# Patient Record
Sex: MALE | Race: WHITE
[De-identification: names, ages, dates, MRNs, and addresses within clinical notes are randomized per-mention and may not be internally consistent; named-entity substitution may affect disease eponyms.]

---

## 2017-03-21 ENCOUNTER — HOSPITAL ENCOUNTER (OUTPATIENT)
Dept: HOSPITAL 45 - C.LAB1850 | Age: 82
Discharge: HOME | End: 2017-03-21
Attending: INTERNAL MEDICINE
Payer: COMMERCIAL

## 2017-03-21 DIAGNOSIS — I10: ICD-10-CM

## 2017-03-21 DIAGNOSIS — R97.20: ICD-10-CM

## 2017-03-21 DIAGNOSIS — C61: ICD-10-CM

## 2017-03-21 DIAGNOSIS — E78.5: Primary | ICD-10-CM

## 2017-03-21 LAB
ANION GAP SERPL CALC-SCNC: 7 MMOL/L (ref 3–11)
BUN SERPL-MCNC: 20 MG/DL (ref 7–18)
BUN/CREAT SERPL: 15 (ref 10–20)
CALCIUM SERPL-MCNC: 9.6 MG/DL (ref 8.5–10.1)
CHLORIDE SERPL-SCNC: 109 MMOL/L (ref 98–107)
CHOLEST/HDLC SERPL: 3.2 {RATIO}
CO2 SERPL-SCNC: 27 MMOL/L (ref 21–32)
CREAT SERPL-MCNC: 1.3 MG/DL (ref 0.6–1.4)
GLUCOSE SERPL-MCNC: 92 MG/DL (ref 70–99)
GLUCOSE UR QL: 37 MG/DL
KETONES UR QL STRIP: 68 MG/DL
NITRITE UR QL STRIP: 77 MG/DL (ref 0–150)
PH UR: 120 MG/DL (ref 0–200)
POTASSIUM SERPL-SCNC: 3.9 MMOL/L (ref 3.5–5.1)
PSA SERPL-MCNC: 10.9 NG/ML (ref 0–4)
SODIUM SERPL-SCNC: 143 MMOL/L (ref 136–145)
VERY LOW DENSITY LIPOPROT CALC: 15 MG/DL

## 2017-09-12 ENCOUNTER — HOSPITAL ENCOUNTER (OUTPATIENT)
Dept: HOSPITAL 45 - C.LAB1850 | Age: 82
Discharge: HOME | End: 2017-09-12
Attending: INTERNAL MEDICINE
Payer: COMMERCIAL

## 2017-09-12 DIAGNOSIS — M10.9: ICD-10-CM

## 2017-09-12 DIAGNOSIS — N18.3: ICD-10-CM

## 2017-09-12 DIAGNOSIS — C61: Primary | ICD-10-CM

## 2017-09-12 DIAGNOSIS — E78.5: ICD-10-CM

## 2017-09-12 DIAGNOSIS — E55.9: ICD-10-CM

## 2017-09-12 DIAGNOSIS — I10: ICD-10-CM

## 2017-09-12 DIAGNOSIS — D64.9: ICD-10-CM

## 2017-09-12 LAB
ANION GAP SERPL CALC-SCNC: 5 MMOL/L (ref 3–11)
BASOPHILS # BLD: 0.05 K/UL (ref 0–0.2)
BASOPHILS NFR BLD: 0.7 %
BUN SERPL-MCNC: 20 MG/DL (ref 7–18)
BUN/CREAT SERPL: 14.2 (ref 10–20)
CALCIUM SERPL-MCNC: 10.1 MG/DL (ref 8.5–10.1)
CHLORIDE SERPL-SCNC: 109 MMOL/L (ref 98–107)
CHOLEST/HDLC SERPL: 3 {RATIO}
CO2 SERPL-SCNC: 28 MMOL/L (ref 21–32)
COMPLETE: YES
CREAT SERPL-MCNC: 1.4 MG/DL (ref 0.6–1.4)
EOSINOPHIL NFR BLD AUTO: 159 K/UL (ref 130–400)
FERRITIN SERPL-MCNC: 139.5 NG/ML (ref 8–388)
GLUCOSE SERPL-MCNC: 98 MG/DL (ref 70–99)
GLUCOSE UR QL: 43 MG/DL
HCT VFR BLD CALC: 42.3 % (ref 42–52)
IG%: 0.4 %
IMM GRANULOCYTES NFR BLD AUTO: 18.6 %
KETONES UR QL STRIP: 67 MG/DL
LYMPHOCYTES # BLD: 1.29 K/UL (ref 1.2–3.4)
MCH RBC QN AUTO: 31.2 PG (ref 25–34)
MCHC RBC AUTO-ENTMCNC: 35 G/DL (ref 32–36)
MCV RBC AUTO: 89.1 FL (ref 80–100)
MONOCYTES NFR BLD: 7.8 %
NEUTROPHILS # BLD AUTO: 5.3 %
NEUTROPHILS NFR BLD AUTO: 67.2 %
NITRITE UR QL STRIP: 91 MG/DL (ref 0–150)
PH UR: 128 MG/DL (ref 0–200)
PMV BLD AUTO: 10 FL (ref 7.4–10.4)
POTASSIUM SERPL-SCNC: 4.5 MMOL/L (ref 3.5–5.1)
PSA SERPL-MCNC: 10.5 NG/ML (ref 0–4)
RBC # BLD AUTO: 4.75 M/UL (ref 4.7–6.1)
SODIUM SERPL-SCNC: 142 MMOL/L (ref 136–145)
URATE SERPL-MCNC: 5.6 MG/DL (ref 2.6–7.2)
VERY LOW DENSITY LIPOPROT CALC: 18 MG/DL
WBC # BLD AUTO: 6.92 K/UL (ref 4.8–10.8)

## 2018-01-09 ENCOUNTER — HOSPITAL ENCOUNTER (OUTPATIENT)
Dept: HOSPITAL 45 - C.LABBC | Age: 83
Discharge: HOME | End: 2018-01-09
Attending: UROLOGY
Payer: COMMERCIAL

## 2018-01-09 DIAGNOSIS — C61: Primary | ICD-10-CM

## 2018-02-13 ENCOUNTER — HOSPITAL ENCOUNTER (OUTPATIENT)
Dept: HOSPITAL 45 - C.MRIBC | Age: 83
Discharge: HOME | End: 2018-02-13
Attending: PHYSICIAN ASSISTANT
Payer: COMMERCIAL

## 2018-02-13 DIAGNOSIS — M54.2: Primary | ICD-10-CM

## 2018-02-13 DIAGNOSIS — M47.812: ICD-10-CM

## 2018-02-13 NOTE — DIAGNOSTIC IMAGING REPORT
CERVICAL SPINE MRI



HISTORY: Neck pain and stiffness.  CERVICALGIA



TECHNIQUE: Multiplanar multisequence MRI of the cervical spine was performed

without the use of contrast.



COMPARISON STUDY:  None.



FINDINGS: Straightening of the cervical spine. There is 1.5 mm of retrolisthesis

of C5 on C6. Moderate to space narrowing at C3-C4. Moderate to severe disc

space. C5-C6, C6-C7, and C7-T1. Prevertebral soft tissues and the C1-C2 interval

are intact. Mild edema adjacent to the left C1-C2 facet is likely due to the

advanced degenerative change at this location. The cervical spinal cord

demonstrates a normal signal intensity.



C2-C3: No significant central canal or left-sided neural foraminal narrowing.

Mild right neural foraminal narrowing.



C3-C4: Broad-based posterior disc osteophyte complex which abuts and slightly

deforms the anterior cord. There is also mild right and moderate to severe left

neural foraminal narrowing.



C4-C5: No significant central canal narrowing. There is mild right neural

foraminal narrowing.



C5-C6: Broad-based posterior disc osteophyte complex which abuts and results in

mild anterior cord deformity. There is also moderate right and severe left

neural foraminal narrowing.



C6-C7: Small broad-based posterior disc osteophyte complex which abuts but does

not deform the anterior cord. There is mild right and moderate to severe left

neural foraminal narrowing.



C7-T1: There is mild left-sided neural foraminal narrowing. No significant

central canal or right-sided neural foraminal narrowing.



IMPRESSION:  



1. Multilevel cervical spondylosis as described above most pronounced at the

C5-C6 level.

2. Straightening of the cervical spine.

3. Approximately 1.5 mm of retrolisthesis of C5 on C6. This is likely due to the

long-standing degenerative change. 







Electronically signed by:  Mani Alexandra M.D.

2/13/2018 9:22 AM



Dictated Date/Time:  2/13/2018 9:10 AM

## 2018-02-15 ENCOUNTER — HOSPITAL ENCOUNTER (OUTPATIENT)
Dept: HOSPITAL 45 - C.LAB1850 | Age: 83
Discharge: HOME | End: 2018-02-15
Attending: NURSE PRACTITIONER
Payer: COMMERCIAL

## 2018-02-15 DIAGNOSIS — N18.3: ICD-10-CM

## 2018-02-15 DIAGNOSIS — I12.9: Primary | ICD-10-CM

## 2018-02-15 DIAGNOSIS — Z90.5: ICD-10-CM

## 2018-02-15 LAB
BUN SERPL-MCNC: 32 MG/DL (ref 7–18)
CALCIUM SERPL-MCNC: 10.1 MG/DL (ref 8.5–10.1)
CO2 SERPL-SCNC: 29 MMOL/L (ref 21–32)
CREAT SERPL-MCNC: 1.55 MG/DL (ref 0.6–1.4)
GLUCOSE SERPL-MCNC: 110 MG/DL (ref 70–99)
POTASSIUM SERPL-SCNC: 4.1 MMOL/L (ref 3.5–5.1)
SODIUM SERPL-SCNC: 140 MMOL/L (ref 136–145)

## 2018-04-09 ENCOUNTER — HOSPITAL ENCOUNTER (OUTPATIENT)
Dept: HOSPITAL 45 - C.LAB1850 | Age: 83
Discharge: HOME | End: 2018-04-09
Attending: INTERNAL MEDICINE
Payer: COMMERCIAL

## 2018-04-09 DIAGNOSIS — C61: ICD-10-CM

## 2018-04-09 DIAGNOSIS — Z90.5: ICD-10-CM

## 2018-04-09 DIAGNOSIS — N18.3: ICD-10-CM

## 2018-04-09 DIAGNOSIS — D64.9: Primary | ICD-10-CM

## 2018-04-09 DIAGNOSIS — N25.81: ICD-10-CM

## 2018-04-09 DIAGNOSIS — R60.9: ICD-10-CM

## 2018-04-09 LAB
ALBUMIN SERPL-MCNC: 3.6 GM/DL (ref 3.4–5)
ALP SERPL-CCNC: 74 U/L (ref 45–117)
ALT SERPL-CCNC: 21 U/L (ref 12–78)
AST SERPL-CCNC: 14 U/L (ref 15–37)
BUN SERPL-MCNC: 21 MG/DL (ref 7–18)
CALCIUM SERPL-MCNC: 9.8 MG/DL (ref 8.5–10.1)
CO2 SERPL-SCNC: 30 MMOL/L (ref 21–32)
CREAT SERPL-MCNC: 1.34 MG/DL (ref 0.6–1.4)
EOSINOPHIL NFR BLD AUTO: 146 K/UL (ref 130–400)
GLUCOSE SERPL-MCNC: 97 MG/DL (ref 70–99)
HCT VFR BLD CALC: 44.2 % (ref 42–52)
HGB BLD-MCNC: 14.5 G/DL (ref 14–18)
MCH RBC QN AUTO: 29.4 PG (ref 25–34)
MCHC RBC AUTO-ENTMCNC: 32.8 G/DL (ref 32–36)
MCV RBC AUTO: 89.5 FL (ref 80–100)
PMV BLD AUTO: 10 FL (ref 7.4–10.4)
POTASSIUM SERPL-SCNC: 4.1 MMOL/L (ref 3.5–5.1)
PROT SERPL-MCNC: 7.5 GM/DL (ref 6.4–8.2)
RED CELL DISTRIBUTION WIDTH CV: 13.5 % (ref 11.5–14.5)
RED CELL DISTRIBUTION WIDTH SD: 43.9 FL (ref 36.4–46.3)
SODIUM SERPL-SCNC: 139 MMOL/L (ref 136–145)
WBC # BLD AUTO: 7.02 K/UL (ref 4.8–10.8)

## 2018-04-13 ENCOUNTER — HOSPITAL ENCOUNTER (OUTPATIENT)
Dept: HOSPITAL 45 - C.LAB1850 | Age: 83
Discharge: HOME | End: 2018-04-13
Attending: INTERNAL MEDICINE
Payer: COMMERCIAL

## 2018-04-13 DIAGNOSIS — C61: Primary | ICD-10-CM
